# Patient Record
Sex: FEMALE | Race: WHITE | Employment: STUDENT | URBAN - METROPOLITAN AREA
[De-identification: names, ages, dates, MRNs, and addresses within clinical notes are randomized per-mention and may not be internally consistent; named-entity substitution may affect disease eponyms.]

---
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
## 2022-01-13 DIAGNOSIS — G44.209 MIXED MIGRAINE AND MUSCLE CONTRACTION HEADACHE: ICD-10-CM

## 2022-01-13 DIAGNOSIS — G43.909 MIXED MIGRAINE AND MUSCLE CONTRACTION HEADACHE: ICD-10-CM

## 2022-01-13 RX ORDER — SUMATRIPTAN 100 MG/1
TABLET, FILM COATED ORAL
Qty: 8 TABLET | Refills: 1 | Status: SHIPPED | OUTPATIENT
Start: 2022-01-13

## 2022-01-13 NOTE — TELEPHONE ENCOUNTER
Refill no protocol available:     Pt requesting refill of   Requested Prescriptions     Pending Prescriptions Disp Refills   • SUMATRIPTAN SUCCINATE 100 MG Oral Tab [Pharmacy Med Name: SUMATRIPTAN SUCC 100 MG TABLET] 8 tablet 1     Sig: TAKE 1/2 A TABLET B

## 2022-09-13 RX ORDER — DOXYCYCLINE HYCLATE 100 MG/1
100 CAPSULE ORAL 2 TIMES DAILY
COMMUNITY
Start: 2022-09-07 | End: 2022-09-17

## 2022-09-16 ENCOUNTER — OFFICE VISIT (OUTPATIENT)
Dept: FAMILY MEDICINE CLINIC | Facility: CLINIC | Age: 22
End: 2022-09-16
Payer: COMMERCIAL

## 2022-09-16 VITALS
OXYGEN SATURATION: 99 % | RESPIRATION RATE: 16 BRPM | HEART RATE: 88 BPM | BODY MASS INDEX: 33.81 KG/M2 | TEMPERATURE: 98 F | WEIGHT: 205.38 LBS | HEIGHT: 65.35 IN | SYSTOLIC BLOOD PRESSURE: 100 MMHG | DIASTOLIC BLOOD PRESSURE: 60 MMHG

## 2022-09-16 DIAGNOSIS — Z30.09 COUNSELING FOR BIRTH CONTROL, ORAL CONTRACEPTIVES: ICD-10-CM

## 2022-09-16 DIAGNOSIS — Z11.8 SCREENING FOR CHLAMYDIAL DISEASE: ICD-10-CM

## 2022-09-16 DIAGNOSIS — F90.0 ATTENTION DEFICIT HYPERACTIVITY DISORDER (ADHD), PREDOMINANTLY INATTENTIVE TYPE: ICD-10-CM

## 2022-09-16 DIAGNOSIS — J30.1 SEASONAL ALLERGIC RHINITIS DUE TO POLLEN: ICD-10-CM

## 2022-09-16 DIAGNOSIS — Z00.00 ANNUAL PHYSICAL EXAM: Primary | ICD-10-CM

## 2022-09-16 PROCEDURE — 87491 CHLMYD TRACH DNA AMP PROBE: CPT | Performed by: FAMILY MEDICINE

## 2022-09-16 PROCEDURE — 87591 N.GONORRHOEAE DNA AMP PROB: CPT | Performed by: FAMILY MEDICINE

## 2022-09-16 RX ORDER — LEVOCETIRIZINE DIHYDROCHLORIDE 5 MG/1
5 TABLET, FILM COATED ORAL EVERY EVENING
Qty: 90 TABLET | Refills: 3 | Status: SHIPPED | OUTPATIENT
Start: 2022-09-16

## 2022-09-16 RX ORDER — NORETHINDRONE ACETATE AND ETHINYL ESTRADIOL 1MG-20(21)
1 KIT ORAL DAILY
Qty: 3 EACH | Refills: 3 | Status: SHIPPED | OUTPATIENT
Start: 2022-09-16 | End: 2023-09-16

## 2022-09-16 RX ORDER — MONTELUKAST SODIUM 10 MG/1
10 TABLET ORAL NIGHTLY
Qty: 90 TABLET | Refills: 3 | Status: SHIPPED | OUTPATIENT
Start: 2022-09-16

## 2022-09-16 RX ORDER — SULFAMETHOXAZOLE AND TRIMETHOPRIM 800; 160 MG/1; MG/1
1 TABLET ORAL 2 TIMES DAILY
COMMUNITY
Start: 2022-05-02 | End: 2022-09-16 | Stop reason: ALTCHOICE

## 2022-09-19 LAB
C TRACH DNA SPEC QL NAA+PROBE: NEGATIVE
N GONORRHOEA DNA SPEC QL NAA+PROBE: NEGATIVE

## 2022-09-22 ENCOUNTER — TELEPHONE (OUTPATIENT)
Dept: FAMILY MEDICINE CLINIC | Facility: CLINIC | Age: 22
End: 2022-09-22

## 2022-09-22 NOTE — TELEPHONE ENCOUNTER
----- Message from Azalia Oakley DO sent at 9/21/2022  3:44 PM CDT -----  Results reviewed. Released to [address] Test show no significant abnormality.

## 2022-10-06 NOTE — TELEPHONE ENCOUNTER
Patient notified of test results via my-chart. Patient has viewed test results.   Seen by patient Familia Roque on 9/22/2022 10:46 AM

## 2023-03-12 ENCOUNTER — OFFICE VISIT (OUTPATIENT)
Dept: FAMILY MEDICINE CLINIC | Facility: CLINIC | Age: 23
End: 2023-03-12
Payer: COMMERCIAL

## 2023-03-12 VITALS
HEART RATE: 88 BPM | BODY MASS INDEX: 28.32 KG/M2 | WEIGHT: 170 LBS | TEMPERATURE: 99 F | HEIGHT: 65 IN | OXYGEN SATURATION: 98 %

## 2023-03-12 DIAGNOSIS — L03.114 CELLULITIS OF LEFT HAND: Primary | ICD-10-CM

## 2023-03-12 PROCEDURE — 3008F BODY MASS INDEX DOCD: CPT | Performed by: FAMILY MEDICINE

## 2023-03-12 PROCEDURE — 99213 OFFICE O/P EST LOW 20 MIN: CPT | Performed by: FAMILY MEDICINE

## 2023-03-12 RX ORDER — DOXYCYCLINE HYCLATE 100 MG/1
100 CAPSULE ORAL 2 TIMES DAILY
Qty: 14 CAPSULE | Refills: 0 | Status: SHIPPED | OUTPATIENT
Start: 2023-03-12 | End: 2023-03-19

## 2023-03-12 NOTE — PATIENT INSTRUCTIONS
Take antibiotics without food and plenty of water. Eat yogurt or take probiotic daily. ([place] is a good example of an OTC probiotic)  Make sure to finish the entire antibiotic treatment. Increase fluids and rest.  Use warm compresses to improve blood flow. Consider cool compresses for swelling and pain. Keep arm elevated for comfort. Use otc tylenol or ibuprofen for comfort as needed. Monitor closely and if redness moves beyond the ink-drawn line, go to the ED for urgent evaluation.

## 2023-09-11 DIAGNOSIS — J30.1 SEASONAL ALLERGIC RHINITIS DUE TO POLLEN: Primary | ICD-10-CM

## 2023-09-11 RX ORDER — LEVOCETIRIZINE DIHYDROCHLORIDE 5 MG/1
5 TABLET, FILM COATED ORAL EVERY EVENING
Qty: 90 TABLET | Refills: 2 | Status: SHIPPED | OUTPATIENT
Start: 2023-09-11

## 2023-09-11 NOTE — TELEPHONE ENCOUNTER
Refill Passed Protocol:     Pt requesting refill of   Requested Prescriptions     Pending Prescriptions Disp Refills    LEVOCETIRIZINE 5 MG Oral Tab [Pharmacy Med Name: LEVOCETIRIZINE 5 MG TABLET] 90 tablet 2     Sig: TAKE 1 TABLET BY MOUTH EVERY DAY IN THE EVENING     Refill was approved and sent to pharmacy:     Last Time Medication was Filled:  9/16/2022    Last Office Visit with Provider: 9/16/2022    No future appointments.

## 2023-09-27 DIAGNOSIS — G43.909 MIXED MIGRAINE AND MUSCLE CONTRACTION HEADACHE: ICD-10-CM

## 2023-09-27 DIAGNOSIS — G44.209 MIXED MIGRAINE AND MUSCLE CONTRACTION HEADACHE: ICD-10-CM

## 2023-09-28 RX ORDER — SUMATRIPTAN 100 MG/1
TABLET, FILM COATED ORAL
Qty: 8 TABLET | Refills: 1 | Status: SHIPPED | OUTPATIENT
Start: 2023-09-28

## 2023-09-28 NOTE — TELEPHONE ENCOUNTER
Patient is requesting Vyvanse but has not been seen since 9/16/2022. Typically any medications are denied after the patient has not been seen for 1 refill. Will refill migraine medication/sumatriptan but will need an office visit for stimulant medications. Stimulant medications require outpatient appointments every 3 months. This is a controlled substance. Patient is overdue for her annual physical and overdue for an ADHD follow-up.

## 2023-09-28 NOTE — TELEPHONE ENCOUNTER
Refill no protocol available:     Pt requesting refill of   Requested Prescriptions     Pending Prescriptions Disp Refills    SUMAtriptan Succinate 100 MG Oral Tab 8 tablet 1     Sig: TAKE 1/2 A TABLET BY MOUTH AS NEEDED FOR MIGRAINE (REPEAT IN 2 HOURS IF NO RELIEF MAX 2 TABS DAILY)     Sent to Provider for review:    Last Time Medication was Filled:    Sumatriptan 1/13/2022  Vyvanse never filled by PCP    Last Office Visit with Provider: 9/16/2023    No future appointments. Per LOV    Return in about 3 months (around 12/16/2022) for medication monitoring- ADHD.

## 2023-09-29 DIAGNOSIS — Z30.09 COUNSELING FOR BIRTH CONTROL, ORAL CONTRACEPTIVES: ICD-10-CM

## 2023-09-29 RX ORDER — NORETHINDRONE ACETATE AND ETHINYL ESTRADIOL AND FERROUS FUMARATE 1MG-20(21)
1 KIT ORAL DAILY
Qty: 84 TABLET | Refills: 3 | OUTPATIENT
Start: 2023-09-29

## 2023-09-29 NOTE — TELEPHONE ENCOUNTER
Refill Failed Protocol:     Pt requesting refill of   Requested Prescriptions     Refused Prescriptions Disp Refills    JUNEL FE 1/20 1-20 MG-MCG Oral Tab [Pharmacy Med Name: Oriana QUINTANILLA 1 MG-20 MCG TABLET] 84 tablet 3     Sig: TAKE 1 TABLET BY MOUTH EVERY DAY     Last Time Medication was Filled:  9/16/2022    Last Office Visit with Provider: 9/16/2022    Unable to approve refill,     Gynecology Medication Protocol [id number]/2023 12:23 AM    PASS-PENDING LAST PAP WNL--VIA MANUAL LOOKUP    Physical or Pelvic/Breast in past 12 or next 3 mos--VIA MANUAL LOOKUP        No future appointments.

## 2023-10-26 DIAGNOSIS — G43.909 MIXED MIGRAINE AND MUSCLE CONTRACTION HEADACHE: ICD-10-CM

## 2023-10-26 DIAGNOSIS — G44.209 MIXED MIGRAINE AND MUSCLE CONTRACTION HEADACHE: ICD-10-CM

## 2023-10-26 NOTE — TELEPHONE ENCOUNTER
Refill no protocol available:     Pt requesting refill of   Requested Prescriptions     Pending Prescriptions Disp Refills    SUMATRIPTAN SUCCINATE 100 MG Oral Tab [Pharmacy Med Name: SUMATRIPTAN SUCC 100 MG TABLET] 9 tablet 1     Sig: TAKE 1/2 A TABLET BY MOUTH AS NEEDED FOR MIGRAINE (REPEAT IN 2 HOURS IF NO RELIEF MAX 2 TABS DAILY)     Sent to Provider for review:    Last Time Medication was Filled:  9/28/2023    Last Office Visit with Provider: 9/16/2022    No future appointments. Per LOV  Return in about 3 months (around 12/16/2022) for medication monitoring- ADHD. Called pt, unable to reach, message left. [facility] sent to pt.

## 2023-10-29 RX ORDER — SUMATRIPTAN 100 MG/1
TABLET, FILM COATED ORAL
Qty: 9 TABLET | Refills: 0 | Status: SHIPPED | OUTPATIENT
Start: 2023-10-29

## 2023-10-30 NOTE — TELEPHONE ENCOUNTER
Pt needs to seen at [facility] annually for reflls. Pt needs to  understand, no further refills until schedules an appointment.  This is standard of care    Please call and inform

## 2023-10-30 NOTE — TELEPHONE ENCOUNTER
LMOM to return call to the office. Provided pt office phone [phone] along with office hours.     Left detailed message stating that pt needs to schedule annual physical exam.

## 2023-11-21 ENCOUNTER — E-ADVICE (OUTPATIENT)
Dept: FAMILY MEDICINE | Age: 23
End: 2023-11-21

## 2023-11-21 ENCOUNTER — APPOINTMENT (OUTPATIENT)
Dept: FAMILY MEDICINE | Age: 23
End: 2023-11-21

## 2023-11-21 VITALS
HEIGHT: 65 IN | HEART RATE: 91 BPM | OXYGEN SATURATION: 98 % | SYSTOLIC BLOOD PRESSURE: 129 MMHG | BODY MASS INDEX: 33.44 KG/M2 | DIASTOLIC BLOOD PRESSURE: 85 MMHG | WEIGHT: 200.73 LBS

## 2023-11-21 DIAGNOSIS — Z30.41 ENCOUNTER FOR SURVEILLANCE OF CONTRACEPTIVE PILLS: ICD-10-CM

## 2023-11-21 DIAGNOSIS — Z00.00 ANNUAL PHYSICAL EXAM: Primary | ICD-10-CM

## 2023-11-21 DIAGNOSIS — Z23 NEED FOR INFLUENZA VACCINATION: ICD-10-CM

## 2023-11-21 DIAGNOSIS — F90.2 ATTENTION DEFICIT HYPERACTIVITY DISORDER (ADHD), COMBINED TYPE: ICD-10-CM

## 2023-11-21 PROCEDURE — 99385 PREV VISIT NEW AGE 18-39: CPT | Performed by: FAMILY MEDICINE

## 2023-11-21 PROCEDURE — 90471 IMMUNIZATION ADMIN: CPT | Performed by: FAMILY MEDICINE

## 2023-11-21 PROCEDURE — 90686 IIV4 VACC NO PRSV 0.5 ML IM: CPT | Performed by: FAMILY MEDICINE

## 2023-11-21 PROCEDURE — 99203 OFFICE O/P NEW LOW 30 MIN: CPT | Performed by: FAMILY MEDICINE

## 2023-11-21 RX ORDER — LISDEXAMFETAMINE DIMESYLATE CAPSULES 20 MG/1
30 CAPSULE ORAL DAILY
COMMUNITY
End: 2023-11-21 | Stop reason: ALTCHOICE

## 2023-11-21 RX ORDER — SUMATRIPTAN 100 MG/1
100 TABLET, FILM COATED ORAL PRN
COMMUNITY

## 2023-11-21 RX ORDER — LISDEXAMFETAMINE DIMESYLATE CAPSULES 30 MG/1
30 CAPSULE ORAL EVERY MORNING
Qty: 30 CAPSULE | Refills: 0 | Status: SHIPPED | OUTPATIENT
Start: 2023-11-21

## 2023-11-21 RX ORDER — NORETHINDRONE ACETATE AND ETHINYL ESTRADIOL 1MG-20(21)
1 KIT ORAL DAILY
Qty: 84 TABLET | Refills: 3 | Status: SHIPPED | OUTPATIENT
Start: 2023-11-21

## 2023-11-21 ASSESSMENT — ENCOUNTER SYMPTOMS
CONSTIPATION: 0
TROUBLE SWALLOWING: 0
SORE THROAT: 0
FEVER: 0
DIARRHEA: 0
DIZZINESS: 0
EYE PAIN: 0
COUGH: 0
SHORTNESS OF BREATH: 0
FATIGUE: 0
ABDOMINAL PAIN: 0

## 2023-11-21 ASSESSMENT — PATIENT HEALTH QUESTIONNAIRE - PHQ9
SUM OF ALL RESPONSES TO PHQ9 QUESTIONS 1 AND 2: 0
2. FEELING DOWN, DEPRESSED OR HOPELESS: NOT AT ALL
1. LITTLE INTEREST OR PLEASURE IN DOING THINGS: NOT AT ALL
CLINICAL INTERPRETATION OF PHQ2 SCORE: NO FURTHER SCREENING NEEDED
SUM OF ALL RESPONSES TO PHQ9 QUESTIONS 1 AND 2: 0

## 2023-11-22 ENCOUNTER — LAB SERVICES (OUTPATIENT)
Dept: LAB | Age: 23
End: 2023-11-22

## 2023-11-22 DIAGNOSIS — Z00.00 ANNUAL PHYSICAL EXAM: ICD-10-CM

## 2023-11-22 DIAGNOSIS — Z30.41 ENCOUNTER FOR SURVEILLANCE OF CONTRACEPTIVE PILLS: ICD-10-CM

## 2023-11-22 LAB
ALBUMIN SERPL-MCNC: 3.4 G/DL (ref 3.6–5.1)
ALBUMIN/GLOB SERPL: 0.9 {RATIO} (ref 1–2.4)
ALP SERPL-CCNC: 86 UNITS/L (ref 45–117)
ALT SERPL-CCNC: 21 UNITS/L
ANION GAP SERPL CALC-SCNC: 11 MMOL/L (ref 7–19)
AST SERPL-CCNC: 16 UNITS/L
B-HCG UR QL: NEGATIVE
BASOPHILS # BLD: 0.1 K/MCL (ref 0–0.3)
BASOPHILS NFR BLD: 1 %
BILIRUB SERPL-MCNC: 0.4 MG/DL (ref 0.2–1)
BUN SERPL-MCNC: 14 MG/DL (ref 6–20)
BUN/CREAT SERPL: 13 (ref 7–25)
CALCIUM SERPL-MCNC: 9.2 MG/DL (ref 8.4–10.2)
CHLORIDE SERPL-SCNC: 103 MMOL/L (ref 97–110)
CHOLEST SERPL-MCNC: 190 MG/DL
CHOLEST/HDLC SERPL: 4.2 {RATIO}
CO2 SERPL-SCNC: 28 MMOL/L (ref 21–32)
CREAT SERPL-MCNC: 1.04 MG/DL (ref 0.51–0.95)
DEPRECATED RDW RBC: 37.3 FL (ref 39–50)
EGFRCR SERPLBLD CKD-EPI 2021: 78 ML/MIN/{1.73_M2}
EOSINOPHIL # BLD: 0.2 K/MCL (ref 0–0.5)
EOSINOPHIL NFR BLD: 2 %
ERYTHROCYTE [DISTWIDTH] IN BLOOD: 11.7 % (ref 11–15)
FASTING DURATION TIME PATIENT: ABNORMAL H
GLOBULIN SER-MCNC: 3.7 G/DL (ref 2–4)
GLUCOSE SERPL-MCNC: 81 MG/DL (ref 70–99)
HCT VFR BLD CALC: 43.8 % (ref 36–46.5)
HDLC SERPL-MCNC: 45 MG/DL
HGB BLD-MCNC: 15 G/DL (ref 12–15.5)
IMM GRANULOCYTES # BLD AUTO: 0 K/MCL (ref 0–0.2)
IMM GRANULOCYTES # BLD: 0 %
LDLC SERPL CALC-MCNC: 128 MG/DL
LYMPHOCYTES # BLD: 1.9 K/MCL (ref 1–4.8)
LYMPHOCYTES NFR BLD: 21 %
MCH RBC QN AUTO: 30.1 PG (ref 26–34)
MCHC RBC AUTO-ENTMCNC: 34.2 G/DL (ref 32–36.5)
MCV RBC AUTO: 87.8 FL (ref 78–100)
MONOCYTES # BLD: 0.7 K/MCL (ref 0.3–0.9)
MONOCYTES NFR BLD: 8 %
NEUTROPHILS # BLD: 6 K/MCL (ref 1.8–7.7)
NEUTROPHILS NFR BLD: 68 %
NONHDLC SERPL-MCNC: 145 MG/DL
NRBC BLD MANUAL-RTO: 0 /100 WBC
PLATELET # BLD AUTO: 326 K/MCL (ref 140–450)
POTASSIUM SERPL-SCNC: 4.6 MMOL/L (ref 3.4–5.1)
PROT SERPL-MCNC: 7.1 G/DL (ref 6.4–8.2)
RBC # BLD: 4.99 MIL/MCL (ref 4–5.2)
SODIUM SERPL-SCNC: 137 MMOL/L (ref 135–145)
TRIGL SERPL-MCNC: 84 MG/DL
TSH SERPL-ACNC: 2.01 MCUNITS/ML (ref 0.35–5)
WBC # BLD: 8.8 K/MCL (ref 4.2–11)

## 2023-11-22 PROCEDURE — 36415 COLL VENOUS BLD VENIPUNCTURE: CPT | Performed by: FAMILY MEDICINE

## 2023-11-22 PROCEDURE — 80061 LIPID PANEL: CPT | Performed by: INTERNAL MEDICINE

## 2023-11-22 PROCEDURE — 81025 URINE PREGNANCY TEST: CPT | Performed by: INTERNAL MEDICINE

## 2023-11-22 PROCEDURE — 80050 GENERAL HEALTH PANEL: CPT | Performed by: INTERNAL MEDICINE

## 2024-03-05 RX ORDER — SUMATRIPTAN 100 MG/1
100 TABLET, FILM COATED ORAL PRN
Qty: 12 TABLET | Refills: 1 | Status: SHIPPED | OUTPATIENT
Start: 2024-03-05

## 2024-08-12 DIAGNOSIS — J30.1 SEASONAL ALLERGIC RHINITIS DUE TO POLLEN: ICD-10-CM

## 2024-08-12 RX ORDER — LEVOCETIRIZINE DIHYDROCHLORIDE 5 MG/1
5 TABLET, FILM COATED ORAL EVERY EVENING
Qty: 30 TABLET | Refills: 0 | OUTPATIENT
Start: 2024-08-12

## 2024-08-12 NOTE — TELEPHONE ENCOUNTER
Medication(s) to Refill:   Requested Prescriptions     Pending Prescriptions Disp Refills    levocetirizine 5 MG Oral Tab [Pharmacy Med Name: LEVOCETIRIZINE 5 MG TABLET] 30 tablet 0     Sig: TAKE 1 TABLET BY MOUTH EVERY DAY IN THE EVENING     Reason for Medication Refill being sent to Provider / Reason Protocol Failed:  [] 90 day refill has already been granted  [] Blood Pressure out of range  [] Labs Abnormal/over due  [] Medication not previously prescribed by Provider  [x] Non-Protocol Medication  [] Controlled Substance   [] Due for appointment- no future appointment scheduled  [] No Follow up specified    Last Time Medication was Filled:  9/11/2023 90 days 0 refills      Last Office Visit with PCP: 9/16/2022   Seasonal allergic rhinitis due to pollen  - montelukast 10 MG Oral Tab; Take 1 tablet (10 mg total) by mouth nightly.  Dispense: 90 tablet; Refill: 3  Controlled    When Patient was Due Back to the Office:  Return in about 3 months (around 12/16/2022) for medication monitoring- ADHD.   (from when PCP last addressed condition)    Future Appointments:  No future appointments.      Last Blood Pressures:  BP Readings from Last 2 Encounters:   09/16/22 100/60   11/22/21 100/64     Action taken:  [] Refill approved per protocol  [] Routing to provider for approval

## 2024-09-05 DIAGNOSIS — F90.2 ATTENTION DEFICIT HYPERACTIVITY DISORDER (ADHD), COMBINED TYPE: ICD-10-CM

## 2024-09-06 ENCOUNTER — E-ADVICE (OUTPATIENT)
Dept: FAMILY MEDICINE | Age: 24
End: 2024-09-06

## 2024-09-06 DIAGNOSIS — F90.2 ATTENTION DEFICIT HYPERACTIVITY DISORDER (ADHD), COMBINED TYPE: ICD-10-CM

## 2024-09-06 RX ORDER — LISDEXAMFETAMINE DIMESYLATE 30 MG/1
30 CAPSULE ORAL EVERY MORNING
Qty: 30 CAPSULE | Refills: 0 | Status: SHIPPED | OUTPATIENT
Start: 2024-09-06

## 2024-09-06 RX ORDER — SUMATRIPTAN 100 MG/1
100 TABLET, FILM COATED ORAL PRN
Qty: 12 TABLET | Refills: 1 | Status: SHIPPED | OUTPATIENT
Start: 2024-09-06

## 2024-09-06 RX ORDER — LISDEXAMFETAMINE DIMESYLATE 30 MG/1
30 CAPSULE ORAL EVERY MORNING
Qty: 30 CAPSULE | Refills: 0 | OUTPATIENT
Start: 2024-09-06

## 2024-09-29 DIAGNOSIS — J30.1 SEASONAL ALLERGIC RHINITIS DUE TO POLLEN: ICD-10-CM

## 2024-09-30 RX ORDER — MOMETASONE FUROATE MONOHYDRATE 50 UG/1
SPRAY, METERED NASAL
Qty: 17 G | Refills: 3 | OUTPATIENT
Start: 2024-09-30

## 2024-09-30 NOTE — TELEPHONE ENCOUNTER
Pt requesting refill of   Requested Prescriptions     Refused Prescriptions Disp Refills    mometasone furoate 50 MCG/ACT Nasal Suspension 17 g 3     Si NASAL SPRAYS IN TO EACH NOSTRIL DAILY     Last Time Medication was Filled: 2021    Last Office Visit with Provider: 2022    Unable to approve refill, patient is overdue for a follow up appointment.         No future appointments.

## 2024-10-27 DIAGNOSIS — Z30.41 ENCOUNTER FOR SURVEILLANCE OF CONTRACEPTIVE PILLS: ICD-10-CM

## 2024-10-28 RX ORDER — NORETHINDRONE ACETATE AND ETHINYL ESTRADIOL AND FERROUS FUMARATE 1MG-20(21)
1 KIT ORAL DAILY
Qty: 84 TABLET | Refills: 0 | Status: SHIPPED | OUTPATIENT
Start: 2024-10-28

## 2024-11-20 SDOH — ECONOMIC STABILITY: FOOD INSECURITY: WITHIN THE PAST 12 MONTHS, THE FOOD YOU BOUGHT JUST DIDN'T LAST AND YOU DIDN'T HAVE MONEY TO GET MORE.: NEVER TRUE

## 2024-11-20 SDOH — ECONOMIC STABILITY: HOUSING INSECURITY: WHAT IS YOUR LIVING SITUATION TODAY?: I HAVE A STEADY PLACE TO LIVE

## 2024-11-20 SDOH — ECONOMIC STABILITY: HOUSING INSECURITY: DO YOU HAVE PROBLEMS WITH ANY OF THE FOLLOWING?: NONE OF THE ABOVE

## 2024-11-20 SDOH — ECONOMIC STABILITY: TRANSPORTATION INSECURITY
IN THE PAST 12 MONTHS, HAS LACK OF RELIABLE TRANSPORTATION KEPT YOU FROM MEDICAL APPOINTMENTS, MEETINGS, WORK OR FROM GETTING THINGS NEEDED FOR DAILY LIVING?: NO

## 2024-11-20 SDOH — ECONOMIC STABILITY: GENERAL: WOULD YOU LIKE HELP WITH ANY OF THE FOLLOWING NEEDS?: I DON'T WANT HELP WITH ANY OF THESE

## 2024-11-20 ASSESSMENT — SOCIAL DETERMINANTS OF HEALTH (SDOH): IN THE PAST 12 MONTHS, HAS THE ELECTRIC, GAS, OIL, OR WATER COMPANY THREATENED TO SHUT OFF SERVICE IN YOUR HOME?: NO

## 2024-11-22 ENCOUNTER — APPOINTMENT (OUTPATIENT)
Dept: FAMILY MEDICINE | Age: 24
End: 2024-11-22

## 2024-11-22 VITALS
WEIGHT: 215.5 LBS | SYSTOLIC BLOOD PRESSURE: 124 MMHG | HEIGHT: 65 IN | DIASTOLIC BLOOD PRESSURE: 81 MMHG | HEART RATE: 75 BPM | BODY MASS INDEX: 35.9 KG/M2 | TEMPERATURE: 98.4 F

## 2024-11-22 DIAGNOSIS — Z23 NEED FOR TDAP VACCINATION: ICD-10-CM

## 2024-11-22 DIAGNOSIS — Z23 NEED FOR IMMUNIZATION AGAINST INFLUENZA: ICD-10-CM

## 2024-11-22 DIAGNOSIS — Z00.00 ANNUAL PHYSICAL EXAM: Primary | ICD-10-CM

## 2024-11-22 RX ORDER — DOXYCYCLINE 100 MG/1
100 CAPSULE ORAL
COMMUNITY
Start: 2024-11-20 | End: 2024-11-30

## 2024-11-22 ASSESSMENT — ENCOUNTER SYMPTOMS
FEVER: 0
CONSTIPATION: 0
DIZZINESS: 0
SHORTNESS OF BREATH: 0
DIARRHEA: 0
TROUBLE SWALLOWING: 0
ABDOMINAL PAIN: 0
EYE PAIN: 0
COUGH: 0
FATIGUE: 0
SORE THROAT: 0

## 2024-11-22 ASSESSMENT — PATIENT HEALTH QUESTIONNAIRE - PHQ9
SUM OF ALL RESPONSES TO PHQ9 QUESTIONS 1 AND 2: 0
2. FEELING DOWN, DEPRESSED OR HOPELESS: NOT AT ALL
CLINICAL INTERPRETATION OF PHQ2 SCORE: NO FURTHER SCREENING NEEDED
SUM OF ALL RESPONSES TO PHQ9 QUESTIONS 1 AND 2: 0
1. LITTLE INTEREST OR PLEASURE IN DOING THINGS: NOT AT ALL

## 2024-11-25 ENCOUNTER — LAB SERVICES (OUTPATIENT)
Dept: LAB | Age: 24
End: 2024-11-25

## 2024-11-25 DIAGNOSIS — Z00.00 ANNUAL PHYSICAL EXAM: ICD-10-CM

## 2024-11-25 LAB
ALBUMIN SERPL-MCNC: 3.4 G/DL (ref 3.4–5)
ALBUMIN/GLOB SERPL: 1 {RATIO} (ref 1–2.4)
ALP SERPL-CCNC: 81 UNITS/L (ref 45–117)
ALT SERPL-CCNC: 22 UNITS/L
ANION GAP SERPL CALC-SCNC: 11 MMOL/L (ref 7–19)
AST SERPL-CCNC: 18 UNITS/L
BASOPHILS # BLD: 0.1 K/MCL (ref 0–0.3)
BASOPHILS NFR BLD: 1 %
BILIRUB SERPL-MCNC: 0.5 MG/DL (ref 0.2–1)
BUN SERPL-MCNC: 8 MG/DL (ref 6–20)
BUN/CREAT SERPL: 9 (ref 7–25)
CALCIUM SERPL-MCNC: 8.8 MG/DL (ref 8.4–10.2)
CHLORIDE SERPL-SCNC: 104 MMOL/L (ref 97–110)
CHOLEST SERPL-MCNC: 195 MG/DL
CHOLEST/HDLC SERPL: 4.1 {RATIO}
CO2 SERPL-SCNC: 27 MMOL/L (ref 21–32)
CREAT SERPL-MCNC: 0.94 MG/DL (ref 0.51–0.95)
DEPRECATED RDW RBC: 37.4 FL (ref 39–50)
EGFRCR SERPLBLD CKD-EPI 2021: 88 ML/MIN/{1.73_M2}
EOSINOPHIL # BLD: 0.2 K/MCL (ref 0–0.5)
EOSINOPHIL NFR BLD: 2 %
ERYTHROCYTE [DISTWIDTH] IN BLOOD: 11.8 % (ref 11–15)
FASTING DURATION TIME PATIENT: 10 HOURS (ref 0–999)
GLOBULIN SER-MCNC: 3.5 G/DL (ref 2–4)
GLUCOSE SERPL-MCNC: 81 MG/DL (ref 70–99)
HCT VFR BLD CALC: 41.9 % (ref 36–46.5)
HDLC SERPL-MCNC: 47 MG/DL
HGB BLD-MCNC: 14.3 G/DL (ref 12–15.5)
IMM GRANULOCYTES # BLD AUTO: 0 K/MCL (ref 0–0.2)
IMM GRANULOCYTES # BLD: 0 %
LDLC SERPL CALC-MCNC: 128 MG/DL
LYMPHOCYTES # BLD: 2.4 K/MCL (ref 1–4.8)
LYMPHOCYTES NFR BLD: 32 %
MCH RBC QN AUTO: 29.7 PG (ref 26–34)
MCHC RBC AUTO-ENTMCNC: 34.1 G/DL (ref 32–36.5)
MCV RBC AUTO: 87.1 FL (ref 78–100)
MONOCYTES # BLD: 0.6 K/MCL (ref 0.3–0.9)
MONOCYTES NFR BLD: 8 %
NEUTROPHILS # BLD: 4.3 K/MCL (ref 1.8–7.7)
NEUTROPHILS NFR BLD: 57 %
NONHDLC SERPL-MCNC: 148 MG/DL
NRBC BLD MANUAL-RTO: 0 /100 WBC
PLATELET # BLD AUTO: 352 K/MCL (ref 140–450)
POTASSIUM SERPL-SCNC: 4.1 MMOL/L (ref 3.4–5.1)
PROT SERPL-MCNC: 6.9 G/DL (ref 6.4–8.2)
RBC # BLD: 4.81 MIL/MCL (ref 4–5.2)
SODIUM SERPL-SCNC: 138 MMOL/L (ref 135–145)
TRIGL SERPL-MCNC: 102 MG/DL
TSH SERPL-ACNC: 3.27 MCUNITS/ML (ref 0.35–5)
WBC # BLD: 7.5 K/MCL (ref 4.2–11)

## 2024-11-25 PROCEDURE — 80050 GENERAL HEALTH PANEL: CPT | Performed by: INTERNAL MEDICINE

## 2024-11-25 PROCEDURE — 36415 COLL VENOUS BLD VENIPUNCTURE: CPT | Performed by: FAMILY MEDICINE

## 2024-11-25 PROCEDURE — 80061 LIPID PANEL: CPT | Performed by: INTERNAL MEDICINE

## 2025-01-16 DIAGNOSIS — Z30.41 ENCOUNTER FOR SURVEILLANCE OF CONTRACEPTIVE PILLS: ICD-10-CM

## 2025-01-16 RX ORDER — NORETHINDRONE ACETATE AND ETHINYL ESTRADIOL AND FERROUS FUMARATE 1MG-20(21)
1 KIT ORAL DAILY
Qty: 84 TABLET | Refills: 3 | Status: SHIPPED | OUTPATIENT
Start: 2025-01-16

## 2025-01-27 ENCOUNTER — E-ADVICE (OUTPATIENT)
Dept: FAMILY MEDICINE | Age: 25
End: 2025-01-27

## 2025-01-27 DIAGNOSIS — J30.89 NON-SEASONAL ALLERGIC RHINITIS, UNSPECIFIED TRIGGER: Primary | ICD-10-CM

## 2025-01-29 RX ORDER — LEVOCETIRIZINE DIHYDROCHLORIDE 5 MG/1
5 TABLET, FILM COATED ORAL EVERY EVENING
Qty: 90 TABLET | Refills: 1 | Status: SHIPPED | OUTPATIENT
Start: 2025-01-29

## 2025-02-17 LAB
C TRACH RRNA SPEC QL NAA+PROBE: NEGATIVE
CYTOLOGY CVX/VAG DOC THIN PREP: ABNORMAL
HPV16+18+45 E6+E7MRNA CVX NAA+PROBE: NEGATIVE
N GONORRHOEA RRNA SPEC QL NAA+PROBE: NEGATIVE

## 2025-04-14 RX ORDER — SUMATRIPTAN SUCCINATE 100 MG/1
100 TABLET ORAL PRN
Qty: 12 TABLET | Refills: 1 | Status: SHIPPED | OUTPATIENT
Start: 2025-04-14

## 2025-06-13 ENCOUNTER — TELEPHONE (OUTPATIENT)
Dept: OTHER | Age: 25
End: 2025-06-13

## 2025-06-13 ENCOUNTER — E-ADVICE (OUTPATIENT)
Dept: FAMILY MEDICINE | Age: 25
End: 2025-06-13

## 2025-06-13 ENCOUNTER — E-ADVICE (OUTPATIENT)
Dept: BARIATRICS/WEIGHT MGMT | Age: 25
End: 2025-06-13

## 2025-06-13 ENCOUNTER — APPOINTMENT (OUTPATIENT)
Dept: FAMILY MEDICINE | Age: 25
End: 2025-06-13

## 2025-06-13 VITALS
HEART RATE: 71 BPM | HEIGHT: 64 IN | SYSTOLIC BLOOD PRESSURE: 121 MMHG | BODY MASS INDEX: 39.18 KG/M2 | DIASTOLIC BLOOD PRESSURE: 75 MMHG | TEMPERATURE: 98.4 F | WEIGHT: 229.5 LBS

## 2025-06-13 DIAGNOSIS — Z02.79 ENCOUNTER FOR ISSUANCE OF MEDICAL CERTIFICATE: ICD-10-CM

## 2025-06-13 DIAGNOSIS — E28.2 PCOS (POLYCYSTIC OVARIAN SYNDROME): ICD-10-CM

## 2025-06-13 DIAGNOSIS — E66.9 OBESITY WITH BODY MASS INDEX OF 30.0-39.9: Primary | ICD-10-CM

## 2025-06-13 PROCEDURE — 99214 OFFICE O/P EST MOD 30 MIN: CPT | Performed by: FAMILY MEDICINE

## 2025-06-13 RX ORDER — NAPROXEN 500 MG/1
TABLET ORAL
COMMUNITY
Start: 2025-06-10

## 2025-06-13 RX ORDER — SPIRONOLACTONE 50 MG/1
50 TABLET, FILM COATED ORAL DAILY
COMMUNITY
Start: 2025-05-15

## 2025-06-13 ASSESSMENT — PATIENT HEALTH QUESTIONNAIRE - PHQ9
CLINICAL INTERPRETATION OF PHQ2 SCORE: NO FURTHER SCREENING NEEDED
1. LITTLE INTEREST OR PLEASURE IN DOING THINGS: NOT AT ALL
SUM OF ALL RESPONSES TO PHQ9 QUESTIONS 1 AND 2: 0
SUM OF ALL RESPONSES TO PHQ9 QUESTIONS 1 AND 2: 0
2. FEELING DOWN, DEPRESSED OR HOPELESS: NOT AT ALL

## 2025-06-16 ENCOUNTER — E-ADVICE (OUTPATIENT)
Dept: FAMILY MEDICINE | Age: 25
End: 2025-06-16

## 2025-06-16 ENCOUNTER — E-ADVICE (OUTPATIENT)
Dept: BARIATRICS/WEIGHT MGMT | Age: 25
End: 2025-06-16

## 2025-06-16 ENCOUNTER — TELEPHONE (OUTPATIENT)
Dept: BARIATRICS/WEIGHT MGMT | Age: 25
End: 2025-06-16

## 2025-06-17 ENCOUNTER — E-ADVICE (OUTPATIENT)
Dept: FAMILY MEDICINE | Age: 25
End: 2025-06-17

## 2025-06-19 PROBLEM — E66.9 OBESITY WITH BODY MASS INDEX OF 30.0-39.9: Status: ACTIVE | Noted: 2025-06-19

## 2025-06-19 PROBLEM — E28.2 PCOS (POLYCYSTIC OVARIAN SYNDROME): Status: ACTIVE | Noted: 2025-06-19

## 2025-06-19 PROBLEM — Z02.79 ENCOUNTER FOR ISSUANCE OF MEDICAL CERTIFICATE: Status: ACTIVE | Noted: 2025-06-19

## 2025-06-19 ASSESSMENT — ENCOUNTER SYMPTOMS
CONSTIPATION: 0
DIARRHEA: 0
TROUBLE SWALLOWING: 0
SORE THROAT: 0
COUGH: 0
FATIGUE: 0
SHORTNESS OF BREATH: 0
ABDOMINAL PAIN: 0

## 2025-06-23 ENCOUNTER — TELEPHONE (OUTPATIENT)
Dept: BARIATRICS/WEIGHT MGMT | Age: 25
End: 2025-06-23

## (undated) NOTE — LETTER
12/17/20        Fabby [facility]  [id number] [place] [id number]      Dear Benton Lewis,    [address] [facility] records indicate that you have outstanding lab work and or testing that was ordered for you and has not yet been completed:  Over due for physical     To provide

## (undated) NOTE — LETTER
Date: 10/10/2019    Patient Name: Codie Garcia          To Whom it may concern: This letter has been written at the patient's request. The above patient was seen at the [facility] for treatment of a medical condition.     Please allow shante